# Patient Record
Sex: MALE | Race: WHITE
[De-identification: names, ages, dates, MRNs, and addresses within clinical notes are randomized per-mention and may not be internally consistent; named-entity substitution may affect disease eponyms.]

---

## 2018-10-18 ENCOUNTER — HOSPITAL ENCOUNTER (INPATIENT)
Dept: HOSPITAL 47 - EC | Age: 63
LOS: 3 days | Discharge: HOME | DRG: 203 | End: 2018-10-21
Payer: COMMERCIAL

## 2018-10-18 VITALS — BODY MASS INDEX: 25 KG/M2

## 2018-10-18 DIAGNOSIS — Z82.49: ICD-10-CM

## 2018-10-18 DIAGNOSIS — J20.9: ICD-10-CM

## 2018-10-18 DIAGNOSIS — R09.02: ICD-10-CM

## 2018-10-18 DIAGNOSIS — Z80.1: ICD-10-CM

## 2018-10-18 DIAGNOSIS — J45.21: Primary | ICD-10-CM

## 2018-10-18 DIAGNOSIS — I10: ICD-10-CM

## 2018-10-18 DIAGNOSIS — Z79.899: ICD-10-CM

## 2018-10-18 DIAGNOSIS — K21.9: ICD-10-CM

## 2018-10-18 DIAGNOSIS — Z79.51: ICD-10-CM

## 2018-10-18 PROCEDURE — 85025 COMPLETE CBC W/AUTO DIFF WBC: CPT

## 2018-10-18 PROCEDURE — 36415 COLL VENOUS BLD VENIPUNCTURE: CPT

## 2018-10-18 PROCEDURE — 94760 N-INVAS EAR/PLS OXIMETRY 1: CPT

## 2018-10-18 PROCEDURE — 85730 THROMBOPLASTIN TIME PARTIAL: CPT

## 2018-10-18 PROCEDURE — 96361 HYDRATE IV INFUSION ADD-ON: CPT

## 2018-10-18 PROCEDURE — 93005 ELECTROCARDIOGRAM TRACING: CPT

## 2018-10-18 PROCEDURE — 94660 CPAP INITIATION&MGMT: CPT

## 2018-10-18 PROCEDURE — 87070 CULTURE OTHR SPECIMN AEROBIC: CPT

## 2018-10-18 PROCEDURE — 96365 THER/PROPH/DIAG IV INF INIT: CPT

## 2018-10-18 PROCEDURE — 80048 BASIC METABOLIC PNL TOTAL CA: CPT

## 2018-10-18 PROCEDURE — 82553 CREATINE MB FRACTION: CPT

## 2018-10-18 PROCEDURE — 94640 AIRWAY INHALATION TREATMENT: CPT

## 2018-10-18 PROCEDURE — 99291 CRITICAL CARE FIRST HOUR: CPT

## 2018-10-18 PROCEDURE — 83880 ASSAY OF NATRIURETIC PEPTIDE: CPT

## 2018-10-18 PROCEDURE — 71045 X-RAY EXAM CHEST 1 VIEW: CPT

## 2018-10-18 PROCEDURE — 83735 ASSAY OF MAGNESIUM: CPT

## 2018-10-18 PROCEDURE — 87205 SMEAR GRAM STAIN: CPT

## 2018-10-18 PROCEDURE — 80053 COMPREHEN METABOLIC PANEL: CPT

## 2018-10-18 PROCEDURE — 85379 FIBRIN DEGRADATION QUANT: CPT

## 2018-10-18 PROCEDURE — 96375 TX/PRO/DX INJ NEW DRUG ADDON: CPT

## 2018-10-18 PROCEDURE — 82550 ASSAY OF CK (CPK): CPT

## 2018-10-18 PROCEDURE — 84484 ASSAY OF TROPONIN QUANT: CPT

## 2018-10-18 PROCEDURE — 85610 PROTHROMBIN TIME: CPT

## 2018-10-18 NOTE — ED
Asthma HPI





- General


Stated Complaint: DAGOBERTO





- History of Present Illness


Initial Comments: 


Zhao is a 63-year-old male who presents to the ED via EMS for evaluation of 

shortness of breath.  Upon initial evaluation the patient is in acute 

respiratory distress, 2 word dyspnea, on CPAP from EMS.  Patient is able to 

answer yes no questions.  Patient reports he's had progressively worsening 

shortness of breath and wheezing for approximately 2 weeks.  It worsened today 

which prompted his wife to call 911.


Patient denies chest pain, palpitations or exertional symptoms.  He reports 

initially his symptoms would improve with using his rescue inhaler but today 

they did not.  He denies any fever or productive cough.





- Related Data


 Home Medications











 Medication  Instructions  Recorded  Confirmed


 


Albuterol Sulfate [Proair Hfa] 1 - 2 puff INHALATION Q6HR PRN 10/18/18 10/18/18


 


Beclomethasone Dipropionate [Qvar 1 puff INHALATION RT-BID 10/18/18 10/18/18





80 mcg]   


 


Fluticasone Propionate [Flovent 1 puff INHALATION RT-DAILY PRN 10/18/18 10/18/18





Diskus]   


 


Lisinopril-Hctz 20-12.5 mg 1 tab PO DAILY 10/18/18 10/18/18





[Zestoretic 20-12.5]   


 


Pantoprazole [Protonix] 40 mg PO DAILY 10/18/18 10/18/18











 Allergies











Allergy/AdvReac Type Severity Reaction Status Date / Time


 


No Known Allergies Allergy   Verified 10/18/18 23:39














Review of Systems


ROS Statement: 


Those systems with pertinent positive or pertinent negative responses have been 

documented in the HPI.





ROS Other: All systems not noted in ROS Statement are negative.





Past Medical History


Past Medical History: Asthma, GERD/Reflux, Hypertension


Additional Past Medical History / Comment(s): receives alg injections for 

seasonal,dust,mold,animal dander,trees every 2 weeks.


History of Any Multi-Drug Resistant Organisms: None Reported


Past Surgical History: Hernia Repair


Past Anesthesia/Blood Transfusion Reactions: No Reported Reaction


Smoking Status: Never smoker





- Past Family History


  ** Mother


Family Medical History: Cancer


Additional Family Medical History / Comment(s): lung mets to brain





  ** Father


Family Medical History: Hypertension


Additional Family Medical History / Comment(s): annemarie





General Exam





- General Exam Comments


Initial Comments: 


Physical Exam


GENERAL:


Patient is in moderate respiratory distress





HENT:


Normocephalic, Atraumatic. 





EYES:


PERRL, EOMI





PULMONARY:


Tachypneic, labored respirations, expiratory and expiratory wheezing, 

retractions





CARDIOVASCULAR:


Tachycardic, regular 


Warm and well-perfused extremities





ABDOMEN:


Soft and nontender with normal bowel sounds. 





SKIN:


Skin is clear with no lesions or rashes and otherwise unremarkable.


There is skin tenting and the patient appears dehydrated





: 


Deferred





NEUROLOGIC:


Patient is alert and oriented x3.  Moving all extremities spontaneously





MUSCULOSKELETAL:


Normal extremities with adequate strength and full range of motion.  No lower 

extremity swelling or edema.  No calf tenderness.  





PSYCHIATRIC:


Normal psychiatric evaluation.  





Limitations: no limitations








Course


 Vital Signs











  10/18/18 10/18/18 10/18/18





  23:26 23:37 23:46


 


Temperature 97.6 F  


 


Pulse Rate 131 H 122 H 


 


Pulse Rate [   





Cardiac Monitor   





]   


 


Respiratory 24 22 20





Rate   


 


Blood Pressure 178/126  


 


Blood Pressure   





[Left Arm]   


 


O2 Sat by Pulse 100  





Oximetry   














  10/18/18 10/19/18 10/19/18





  23:51 00:06 00:54


 


Temperature   


 


Pulse Rate 120 H 116 H 101 H


 


Pulse Rate [   





Cardiac Monitor   





]   


 


Respiratory 18 14 13





Rate   


 


Blood Pressure  138/96 117/88


 


Blood Pressure   





[Left Arm]   


 


O2 Sat by Pulse  99 100





Oximetry   














  10/19/18 10/19/18





  01:43 01:45


 


Temperature  97.8 F


 


Pulse Rate  90


 


Pulse Rate [ 116 H 





Cardiac Monitor  





]  


 


Respiratory 26 H 14





Rate  


 


Blood Pressure  122/78


 


Blood Pressure 142/70 





[Left Arm]  


 


O2 Sat by Pulse 98 99





Oximetry  














Medical Decision Making





- Medical Decision Making


Patient was seen and evaluated immediately upon arrival in the emergency 

department.  Patient was noted to be in moderate respiratory distress.  Patient 

was on CPAP with oxygen saturations in the 100s.  BiPAP was immediately placed.





Patient and received a single DuoNeb treatment in route to the hospital - 

double DuoNeb therapy and steroids were ordered





Labs and imaging were ordered


EKG with sinus tachycardia





Labs with elevated globin which is likely hemoconcentration secondary to 

dehydration, troponin and d-dimer negative


Patient improving after double DuoNeb and steroids and oxygen saturation 

remains 100% on BiPAP





Patient reports improvement, but has persistent wheezing, IV magnesium was 

ordered





At this time I do feel the patient warrants admission to the hospital.  His 

oxygenation is improving his work of breathing is improving, his tachypnea and 

is improving as well as his tachycardia.  I suspect the patient will require 

round-the-clock duo nebs and steroids.  Patient is agreeable to plan for 

admission.  Patient is currently on BiPAP I will plan to admit to the stepdown 

unit.





- Lab Data


Result diagrams: 


 10/18/18 23:37





 10/18/18 23:37


 Lab Results











  10/18/18 10/18/18 10/18/18 Range/Units





  23:37 23:37 23:37 


 


WBC   9.8   (3.8-10.6)  k/uL


 


RBC   5.07   (4.30-5.90)  m/uL


 


Hgb   18.0 H   (13.0-17.5)  gm/dL


 


Hct   52.5   (39.0-53.0)  %


 


MCV   103.6 H   (80.0-100.0)  fL


 


MCH   35.5 H   (25.0-35.0)  pg


 


MCHC   34.3   (31.0-37.0)  g/dL


 


RDW   12.7   (11.5-15.5)  %


 


Plt Count   275   (150-450)  k/uL


 


Neutrophils %   68   %


 


Lymphocytes %   11   %


 


Monocytes %   5   %


 


Eosinophils %   13   %


 


Basophils %   1   %


 


Neutrophils #   6.7   (1.3-7.7)  k/uL


 


Lymphocytes #   1.1   (1.0-4.8)  k/uL


 


Monocytes #   0.5   (0-1.0)  k/uL


 


Eosinophils #   1.3 H   (0-0.7)  k/uL


 


Basophils #   0.1   (0-0.2)  k/uL


 


Macrocytosis   Slight   


 


PT     (9.0-12.0)  sec


 


INR     (<1.2)  


 


APTT     (22.0-30.0)  sec


 


D-Dimer     (<0.60)  mg/L FEU


 


Sodium    141  (137-145)  mmol/L


 


Potassium    3.9  (3.5-5.1)  mmol/L


 


Chloride    101  ()  mmol/L


 


Carbon Dioxide    28  (22-30)  mmol/L


 


Anion Gap    12  mmol/L


 


BUN    17  (9-20)  mg/dL


 


Creatinine    0.98  (0.66-1.25)  mg/dL


 


Est GFR (CKD-EPI)AfAm    >90  (>60 ml/min/1.73 sqM)  


 


Est GFR (CKD-EPI)NonAf    84  (>60 ml/min/1.73 sqM)  


 


Glucose    133 H  (74-99)  mg/dL


 


Calcium    9.7  (8.4-10.2)  mg/dL


 


Magnesium    2.2  (1.6-2.3)  mg/dL


 


Total Bilirubin    1.1  (0.2-1.3)  mg/dL


 


AST    43  (17-59)  U/L


 


ALT    42  (21-72)  U/L


 


Alkaline Phosphatase    99  ()  U/L


 


Total Creatine Kinase  78    ()  U/L


 


CK-MB (CK-2)  1.7    (0.0-2.4)  ng/mL


 


CK-MB (CK-2) Rel Index  2.2    


 


Troponin I  <0.012    (0.000-0.034)  ng/mL


 


NT-Pro-B Natriuret Pep     pg/mL


 


Total Protein    8.4 H  (6.3-8.2)  g/dL


 


Albumin    4.6  (3.5-5.0)  g/dL














  10/18/18 10/18/18 Range/Units





  23:37 23:37 


 


WBC    (3.8-10.6)  k/uL


 


RBC    (4.30-5.90)  m/uL


 


Hgb    (13.0-17.5)  gm/dL


 


Hct    (39.0-53.0)  %


 


MCV    (80.0-100.0)  fL


 


MCH    (25.0-35.0)  pg


 


MCHC    (31.0-37.0)  g/dL


 


RDW    (11.5-15.5)  %


 


Plt Count    (150-450)  k/uL


 


Neutrophils %    %


 


Lymphocytes %    %


 


Monocytes %    %


 


Eosinophils %    %


 


Basophils %    %


 


Neutrophils #    (1.3-7.7)  k/uL


 


Lymphocytes #    (1.0-4.8)  k/uL


 


Monocytes #    (0-1.0)  k/uL


 


Eosinophils #    (0-0.7)  k/uL


 


Basophils #    (0-0.2)  k/uL


 


Macrocytosis    


 


PT   10.4  (9.0-12.0)  sec


 


INR   1.1  (<1.2)  


 


APTT   24.5  (22.0-30.0)  sec


 


D-Dimer   0.35  (<0.60)  mg/L FEU


 


Sodium    (137-145)  mmol/L


 


Potassium    (3.5-5.1)  mmol/L


 


Chloride    ()  mmol/L


 


Carbon Dioxide    (22-30)  mmol/L


 


Anion Gap    mmol/L


 


BUN    (9-20)  mg/dL


 


Creatinine    (0.66-1.25)  mg/dL


 


Est GFR (CKD-EPI)AfAm    (>60 ml/min/1.73 sqM)  


 


Est GFR (CKD-EPI)NonAf    (>60 ml/min/1.73 sqM)  


 


Glucose    (74-99)  mg/dL


 


Calcium    (8.4-10.2)  mg/dL


 


Magnesium    (1.6-2.3)  mg/dL


 


Total Bilirubin    (0.2-1.3)  mg/dL


 


AST    (17-59)  U/L


 


ALT    (21-72)  U/L


 


Alkaline Phosphatase    ()  U/L


 


Total Creatine Kinase    ()  U/L


 


CK-MB (CK-2)    (0.0-2.4)  ng/mL


 


CK-MB (CK-2) Rel Index    


 


Troponin I    (0.000-0.034)  ng/mL


 


NT-Pro-B Natriuret Pep  160   pg/mL


 


Total Protein    (6.3-8.2)  g/dL


 


Albumin    (3.5-5.0)  g/dL














- EKG Data


EKG Comments: 


EKG obtained at 11:29 PM, rate is 131, rhythm is sinus, there is a normal axis, 

normal intervals, , QRS 76, QTC is 431.  There is some hyperacute T waves 

in V2 through V4, no specific ST elevations or depressions.  No evidence of 

acute ischemia or infarction.  We will repeat EKG.








Critical Care Time


Critical Care Time: Yes


Total Critical Care Time: 30





Disposition


Clinical Impression: 


 Asthma with exacerbation





Disposition: ADMITTED AS IP TO THIS HOSP


Condition: Serious


Is patient prescribed a controlled substance at d/c from ED?: No

## 2018-10-19 LAB
ALBUMIN SERPL-MCNC: 4.6 G/DL (ref 3.5–5)
ALP SERPL-CCNC: 99 U/L (ref 38–126)
ALT SERPL-CCNC: 42 U/L (ref 21–72)
ANION GAP SERPL CALC-SCNC: 12 MMOL/L
APTT BLD: 24.5 SEC (ref 22–30)
AST SERPL-CCNC: 43 U/L (ref 17–59)
BASOPHILS # BLD AUTO: 0.1 K/UL (ref 0–0.2)
BASOPHILS NFR BLD AUTO: 1 %
BUN SERPL-SCNC: 17 MG/DL (ref 9–20)
CALCIUM SPEC-MCNC: 9.7 MG/DL (ref 8.4–10.2)
CHLORIDE SERPL-SCNC: 101 MMOL/L (ref 98–107)
CK SERPL-CCNC: 78 U/L (ref 55–170)
CO2 SERPL-SCNC: 28 MMOL/L (ref 22–30)
D DIMER PPP FEU-MCNC: 0.35 MG/L FEU (ref ?–0.6)
EOSINOPHIL # BLD AUTO: 1.3 K/UL (ref 0–0.7)
EOSINOPHIL NFR BLD AUTO: 13 %
ERYTHROCYTE [DISTWIDTH] IN BLOOD BY AUTOMATED COUNT: 5.07 M/UL (ref 4.3–5.9)
ERYTHROCYTE [DISTWIDTH] IN BLOOD: 12.7 % (ref 11.5–15.5)
GLUCOSE BLD-MCNC: 166 MG/DL (ref 75–99)
GLUCOSE BLD-MCNC: 168 MG/DL (ref 75–99)
GLUCOSE SERPL-MCNC: 133 MG/DL (ref 74–99)
HCT VFR BLD AUTO: 52.5 % (ref 39–53)
HGB BLD-MCNC: 18 GM/DL (ref 13–17.5)
INR PPP: 1.1 (ref ?–1.2)
LYMPHOCYTES # SPEC AUTO: 1.1 K/UL (ref 1–4.8)
LYMPHOCYTES NFR SPEC AUTO: 11 %
MAGNESIUM SPEC-SCNC: 2.2 MG/DL (ref 1.6–2.3)
MCH RBC QN AUTO: 35.5 PG (ref 25–35)
MCHC RBC AUTO-ENTMCNC: 34.3 G/DL (ref 31–37)
MCV RBC AUTO: 103.6 FL (ref 80–100)
MONOCYTES # BLD AUTO: 0.5 K/UL (ref 0–1)
MONOCYTES NFR BLD AUTO: 5 %
NEUTROPHILS # BLD AUTO: 6.7 K/UL (ref 1.3–7.7)
NEUTROPHILS NFR BLD AUTO: 68 %
PLATELET # BLD AUTO: 275 K/UL (ref 150–450)
POTASSIUM SERPL-SCNC: 3.9 MMOL/L (ref 3.5–5.1)
PROT SERPL-MCNC: 8.4 G/DL (ref 6.3–8.2)
PT BLD: 10.4 SEC (ref 9–12)
SODIUM SERPL-SCNC: 141 MMOL/L (ref 137–145)
TROPONIN I SERPL-MCNC: <0.012 NG/ML (ref 0–0.03)
WBC # BLD AUTO: 9.8 K/UL (ref 3.8–10.6)

## 2018-10-19 RX ADMIN — IPRATROPIUM BROMIDE AND ALBUTEROL SULFATE SCH: .5; 3 SOLUTION RESPIRATORY (INHALATION) at 18:58

## 2018-10-19 RX ADMIN — IPRATROPIUM BROMIDE AND ALBUTEROL SULFATE SCH: .5; 3 SOLUTION RESPIRATORY (INHALATION) at 23:33

## 2018-10-19 RX ADMIN — IPRATROPIUM BROMIDE AND ALBUTEROL SULFATE SCH ML: .5; 3 SOLUTION RESPIRATORY (INHALATION) at 19:37

## 2018-10-19 RX ADMIN — HEPARIN SODIUM SCH UNIT: 5000 INJECTION, SOLUTION INTRAVENOUS; SUBCUTANEOUS at 08:46

## 2018-10-19 RX ADMIN — METHYLPREDNISOLONE SODIUM SUCCINATE SCH MG: 125 INJECTION, POWDER, FOR SOLUTION INTRAMUSCULAR; INTRAVENOUS at 13:11

## 2018-10-19 RX ADMIN — INSULIN ASPART SCH: 100 INJECTION, SOLUTION INTRAVENOUS; SUBCUTANEOUS at 20:23

## 2018-10-19 RX ADMIN — HEPARIN SODIUM SCH UNIT: 5000 INJECTION, SOLUTION INTRAVENOUS; SUBCUTANEOUS at 16:39

## 2018-10-19 RX ADMIN — METHYLPREDNISOLONE SODIUM SUCCINATE SCH MG: 125 INJECTION, POWDER, FOR SOLUTION INTRAMUSCULAR; INTRAVENOUS at 16:40

## 2018-10-19 RX ADMIN — FAMOTIDINE SCH MG: 10 INJECTION, SOLUTION INTRAVENOUS at 20:36

## 2018-10-19 RX ADMIN — FORMOTEROL FUMARATE DIHYDRATE SCH MCG: 20 SOLUTION RESPIRATORY (INHALATION) at 19:37

## 2018-10-19 RX ADMIN — INSULIN ASPART SCH UNIT: 100 INJECTION, SOLUTION INTRAVENOUS; SUBCUTANEOUS at 16:39

## 2018-10-19 RX ADMIN — IPRATROPIUM BROMIDE AND ALBUTEROL SULFATE SCH ML: .5; 3 SOLUTION RESPIRATORY (INHALATION) at 15:40

## 2018-10-19 RX ADMIN — BUDESONIDE SCH MG: 1 SUSPENSION RESPIRATORY (INHALATION) at 19:37

## 2018-10-19 RX ADMIN — LEVOFLOXACIN SCH MG: 500 TABLET, FILM COATED ORAL at 13:07

## 2018-10-19 NOTE — XR
EXAMINATION TYPE: XR chest 1V portable

 

DATE OF EXAM: 10/18/2018

 

COMPARISON: 10/25/2013

 

HISTORY: Difficulty breathing

 

TECHNIQUE: Single frontal view of the chest is obtained.

 

FINDINGS:  Heart and mediastinum are normal. Lungs are clear. Diaphragm is normal. There are chest le
ads. Bony thorax is intact.

 

IMPRESSION:  Normal chest. No change.

## 2018-10-19 NOTE — P.HPIM
History of Present Illness





This is a pleasant 63 years old female with past medical history of asthma, GERD

, hypertension.  Who presents because of acute dyspnea.  Typical asthma attack.

  Patient states he has history of asthma since age 8.  And over the last 2 

weeks he was getting progressively short of breath despite the wife was asking 

him to come to the hospital or see his doctor.  Yesterday he got severely short 

of breath after he came from marked fecal debris.  I decided to come to the 

hospital.  His dyspnea is associated with a dry cough.  No phlegm.  No chest 

pain.  No change in urine or bowel habits.  No upper respiratory tract symptoms.


no Fever.  No sick contacts.  Patient states he has 2 ducts for 5 years which 

he counseled to keep away from contributed to his asthma attack.


Patient was started on steroids and breathing treatment and oxygen and 

antibiotic.  And he starts feeling better





Review of Systems





CONSTITUTIONAL: No fever, no malaise, no fatigue. 


HEENT: No recent visual problems or hearing problems. Denied any sore throat. 


CARDIOVASCULAR: No  orthopnea, PND, no palpitations, no syncope. 


PULMONARY: No shortness of breath, no cough, no hemoptysis. 


GASTROINTESTINAL: No diarrhea, no nausea, no vomiting, no abdominal pain. 

Normoactive bowel sounds. 


NEUROLOGICAL: No headaches, no weakness, no numbness. 


HEMATOLOGICAL: Denies any bleeding or petechiae. 


GENITOURINARY: Denies any burning micturition, frequency, or urgency. 


MUSCULOSKELETAL/RHEUMATOLOGICAL: Denies any joint pain, swelling, or any muscle 

pain. 


ENDOCRINE: Denies any polyuria or polydipsia.














Past Medical History


Past Medical History: Asthma, GERD/Reflux, Hypertension


Additional Past Medical History / Comment(s): receives alg injections for 

seasonal,dust,mold,animal dander,trees every 2 weeks.


History of Any Multi-Drug Resistant Organisms: None Reported


Past Surgical History: Hernia Repair


Past Anesthesia/Blood Transfusion Reactions: No Reported Reaction


Smoking Status: Never smoker





- Past Family History


  ** Mother


Family Medical History: Cancer


Additional Family Medical History / Comment(s): lung mets to brain





  ** Father


Family Medical History: Hypertension


Additional Family Medical History / Comment(s): [employer]





Medications and Allergies


 Home Medications











 Medication  Instructions  Recorded  Confirmed  Type


 


Albuterol Sulfate [Proair Hfa] 1 - 2 puff INHALATION Q6HR PRN 10/18/18 10/18/18 

History


 


Beclomethasone Dipropionate [Qvar 1 puff INHALATION RT-BID 10/18/18 10/18/18 

History





80 mcg]    


 


Fluticasone Propionate [Flovent 1 puff INHALATION RT-DAILY PRN 10/18/18 10/18/

18 History





Diskus]    


 


Lisinopril-Hctz 20-12.5 mg 1 tab PO DAILY 10/18/18 10/18/18 History





[Zestoretic 20-12.5]    


 


Pantoprazole [Protonix] 40 mg PO DAILY 10/18/18 10/18/18 History











 Allergies











Allergy/AdvReac Type Severity Reaction Status Date / Time


 


No Known Allergies Allergy   Verified 10/18/18 23:39














Physical Exam


Vitals: 


 Vital Signs











  Temp Pulse Pulse Resp BP BP Pulse Ox


 


 10/19/18 16:00    99  16   124/67  99


 


 10/19/18 15:56   104 H   20   


 


 10/19/18 15:41   104 H   20   


 


 10/19/18 12:00    85  16   114/63 


 


 10/19/18 11:57     16   


 


 10/19/18 11:16   104 H     


 


 10/19/18 11:06   100     


 


 10/19/18 08:00  97.2 F L   103 H  16   122/70  97


 


 10/19/18 07:34   110 H     


 


 10/19/18 07:24   108 H     


 


 10/19/18 04:00  98 F   96  24    97


 


 10/19/18 01:45  97.8 F  90   14  122/78   99


 


 10/19/18 01:43    116 H  26 H   142/70  98


 


 10/19/18 00:54   101 H   13  117/88   100


 


 10/19/18 00:06   116 H   14  138/96   99


 


 10/18/18 23:51   120 H   18   


 


 10/18/18 23:46     20   


 


 10/18/18 23:37   122 H   22   


 


 10/18/18 23:26  97.6 F  131 H   24  178/126   100








 Intake and Output











 10/19/18 10/19/18 10/19/18





 06:59 14:59 22:59


 


Intake Total 480 918 


 


Balance 480 918 


 


Intake:   


 


  Intake, IV Titration  600 





  Amount   


 


    Sodium Chloride 0.9% 1,  600 





    000 ml @ 75 mls/hr IV .   





    [id number] STA Rx#:[id number]   


 


  Oral 480 318 


 


Other:   


 


  Voiding Method Urinal Urinal 


 


  # Voids 1  


 


  Weight 75.5 kg  














GENERAL: The patient is alert and oriented x3, not in any acute distress. Well 

developed, well nourished. 


HEENT: Pupils are round and equally reacting to light. EOMI. No scleral 

icterus. No conjunctival pallor. Normocephalic, atraumatic. No pharyngeal 

erythema. No thyromegaly. 


CARDIOVASCULAR: S1 and S2 present. No murmurs, rubs, or gallops. 


PULMONARY: Chest is clear to auscultation, no wheezing or crackles. 


ABDOMEN: Soft, nontender, nondistended, normoactive bowel sounds. No palpable 

organomegaly. 


MUSCULOSKELETAL: No joint swelling or deformity. 


EXTREMITIES: No cyanosis, clubbing, or pedal edema. 


NEUROLOGICAL: Gross neurological examination did not reveal any focal deficits. 


SKIN: No rashes.





Results


CBC & Chem 7: 


 10/18/18 23:37





 10/18/18 23:37


Labs: 


 Abnormal Lab Results - Last 24 Hours (Table)











  10/18/18 10/18/18 10/19/18 Range/Units





  23:37 23:37 11:53 


 


Hgb  18.0 H    (13.0-17.5)  gm/dL


 


MCV  103.6 H    (80.0-100.0)  fL


 


MCH  35.5 H    (25.0-35.0)  pg


 


Eosinophils #  1.3 H    (0-0.7)  k/uL


 


Glucose   133 H   (74-99)  mg/dL


 


POC Glucose (mg/dL)    168 H  (75-99)  mg/dL


 


Total Protein   8.4 H   (6.3-8.2)  g/dL














  10/19/18 Range/Units





  16:35 


 


Hgb   (13.0-17.5)  gm/dL


 


MCV   (80.0-100.0)  fL


 


MCH   (25.0-35.0)  pg


 


Eosinophils #   (0-0.7)  k/uL


 


Glucose   (74-99)  mg/dL


 


POC Glucose (mg/dL)  166 H  (75-99)  mg/dL


 


Total Protein   (6.3-8.2)  g/dL














Thrombosis Risk Factor Assmnt





- Choose All That Apply


Each Risk Factor Represents 2 Points: Age 61-74 years


Thrombosis Risk Factor Assessment Total Risk Factor Score: 2


Thrombosis Risk Factor Assessment Level: Low Risk





Assessment and Plan


Assessment: 





Acute asthma attack


Possible acute tracheobronchitis


Essential hypertension


GERD 


Plan: 





This is a pleasant 63 years old female who presents because of acute asthma 

exacerbation.  Continue with steroids, oxygen, breathing treatment and 

antibiotic.  Continue same treatment.  Continue symptomatic treatment.  Resume 

home medication.  Monitor lytes and vitals.  Pulmonary consult is appreciated.  

GI and DVT prophylaxis.  Further recommendation the clinical course of the 

patient


DVT prophylaxis:Subcutaneous heparin


GI prophylaxis:Pepcid


PT/OT: Pending


Prognosis is guarded

## 2018-10-19 NOTE — P.CNPUL
History of Present Illness


Consult date: 10/19/18


Reason for consult: dyspnea, cough, asthma, hypoxemia, abnormal CXR/CT


Chief complaint: Shortness of breath and asthma exacerbation


History of present illness: 





Pulmonary consult dated 10/19/2018





63-year-old male with a history of gastroesophageal reflux disease, hypertension

, and chronic bronchial asthma.  The patient sees a physician out in [place] for 

his medical issues.  Over the last number of days, the patient states his 

asthma is acting up.  He apparently delivers fertilizer farmers and apparently 

it was particularly stefanie.  He states he has profound shortness of breath chest 

tightness wheezing and cough.  Coughing up some phlegm which is yellow-green in 

color.  The patient denies any fever or chills.  He apparently has been using 

his rescue inhaler many many times and he actually estimates that he is use his 

rescue inhaler 42 times.  The patient is typically on protonic's lisinopril/

HCTZ Qvar albuterol updrafts and albuterol inhaler for his medical issues.  The 

patient states that the short acting beta agonist really did not help.  For 

that reason, he came into the emergency room to be evaluated.  He is feeling a 

bit better today but certainly has a long way to go yet.  Again the patient has 

never seen a pulmonologist in the past.  This probably useful if he does.  He 

has NO KNOWN DRUG ALLERGIES.  He apparently receives ALLERGY injections for 

seasonal allergens dust mold and animal dander trees, etc., every 2 weeks.  

Surgically, he has a history of hernia repair.  He is a lifelong nonsmoker.





Review of Systems





A 14 point review of system is positive for shortness of breath chest tightness 

wheezing cough and yellow brown phlegm production.  No fever or chills.





Past Medical History


Past Medical History: Asthma, GERD/Reflux, Hypertension


Additional Past Medical History / Comment(s): receives alg injections for 

seasonal,dust,mold,animal dander,trees every 2 weeks.


History of Any Multi-Drug Resistant Organisms: None Reported


Past Surgical History: Hernia Repair


Past Anesthesia/Blood Transfusion Reactions: No Reported Reaction


Smoking Status: Never smoker





- Past Family History


  ** Mother


Family Medical History: Cancer


Additional Family Medical History / Comment(s): lung mets to brain





  ** Father


Family Medical History: Hypertension


Additional Family Medical History / Comment(s): pacermaker





Medications and Allergies


 Home Medications











 Medication  Instructions  Recorded  Confirmed  Type


 


Albuterol Sulfate [Proair Hfa] 1 - 2 puff INHALATION Q6HR PRN 10/18/18 10/18/18 

History


 


Beclomethasone Dipropionate [Qvar 1 puff INHALATION RT-BID 10/18/18 10/18/18 

History





80 mcg]    


 


Fluticasone Propionate [Flovent 1 puff INHALATION RT-DAILY PRN 10/18/18 10/18/

18 History





Diskus]    


 


Lisinopril-Hctz 20-12.5 mg 1 tab PO DAILY 10/18/18 10/18/18 History





[Zestoretic 20-12.5]    


 


Pantoprazole [Protonix] 40 mg PO DAILY 10/18/18 10/18/18 History











 Allergies











Allergy/AdvReac Type Severity Reaction Status Date / Time


 


No Known Allergies Allergy   Verified 10/18/18 23:39














Physical Exam


Osteopathic Statement: *.  No significant issues noted on an osteopathic 

structural exam other than those noted in the History and Physical/Consult.


Vitals: 


 Vital Signs











  Temp Pulse Pulse Resp BP BP Pulse Ox


 


 10/19/18 11:16   104 H     


 


 10/19/18 11:06   100     


 


 10/19/18 08:00  97.2 F L   103 H  16   122/70  97


 


 10/19/18 07:34   110 H     


 


 10/19/18 07:24   108 H     


 


 10/19/18 04:00  98 F   96  24    97


 


 10/19/18 01:45  97.8 F  90   14  122/78   99


 


 10/19/18 01:43    116 H  26 H   142/70  98


 


 10/19/18 00:54   101 H   13  117/88   100


 


 10/19/18 00:06   116 H   14  138/96   99


 


 10/18/18 23:51   120 H   18   


 


 10/18/18 23:46     20   


 


 10/18/18 23:37   122 H   22   


 


 10/18/18 23:26  97.6 F  131 H   24  178/126   100








 Intake and Output











 10/18/18 10/19/18 10/19/18





 22:59 06:59 14:59


 


Intake Total  480 118


 


Balance  480 118


 


Intake:   


 


  Oral  480 118


 


Other:   


 


  Voiding Method  Urinal 


 


  # Voids  1 


 


  Weight  75.5 kg 














No acute distress, oriented 3.  Mildly tachypnea and dyspneic.  Some mild 

audible wheezing noted.





HEENT examination is grossly unremarkable.  Mucous membranes are moist.  No 

oral lesions.





Neck supple.  Full range of motion.  No adenopathy thyromegaly or neck vein 

distention.





Cardiovascular examination reveals regular rhythm rate.  S1-S2 normal.  No S3 

or S4.  No discernible murmur noted.  Heart rate is 100.





Lungs reveal diminished breath sounds throughout.  There is prolongation on 

forced maneuver.  Patient coughs and wheezes on forced maneuver.  Breath sounds 

are equal bilaterally.  Adventitious lung sounds are more prominent on forced 

maneuver..





Abdomen soft bowel sounds are heard.  No masses or tenderness.





Extremities are intact.  No cyanosis clubbing or edema.





Skin is without rash or lesion.





Neurologic examination is brief but nonfocal.





Results





- Laboratory Findings


CBC and BMP: 


 10/18/18 23:37





 10/18/18 23:37


PT/INR, D-dimer











PT  10.4 sec (9.0-12.0)   10/18/18  23:37    


 


INR  1.1  (<1.2)   10/18/18  23:37    


 


D-Dimer  0.35 mg/L FEU (<0.60)   10/18/18  23:37    








Abnormal lab findings: 


 Abnormal Labs











  10/18/18 10/18/18





  23:37 23:37


 


Hgb  18.0 H 


 


MCV  103.6 H 


 


MCH  35.5 H 


 


Eosinophils #  1.3 H 


 


Glucose   133 H


 


Total Protein   8.4 H














- Diagnostic Findings


Chest x-ray: report reviewed, image reviewed (Chest x-ray, labs and medications 

are reviewed extensively.)





Assessment and Plan


Assessment: 





Assessment





Asthma exacerbation likely complicated by purulent tracheobronchitis





Lifelong nonsmoker.





History of hypertension





History of gastroesophageal reflux disease








Plan: 





Plan dated 10/19/2018





The patient's medications are reviewed.  He'll need a short acting beta agonist

, I short acting muscarinic antagonist, a long-acting beta agonist and inhaled 

corticosteroid.  In addition, the patient will need systemic corticosteroids 

and oral antibiotics.  He probably will need another day or 2 in the hospital.  

He apparently was on Singulair in the past but stopped it because it caused 

abdominal pains.  He stopped and then resumed it after 2 weeks and it again 

caused abdominal pains and he has never gone back on it.  In addition, he is 

getting ALLERGY shots for one of the local allergist.  He get shots every 2 

weeks.  The patient will need follow-up in our office post discharge.  No 

additional recommendations are made.  Prognosis is guarded.


Time with Patient: Greater than 30

## 2018-10-20 VITALS — RESPIRATION RATE: 20 BRPM

## 2018-10-20 LAB
ANION GAP SERPL CALC-SCNC: 9 MMOL/L
BASOPHILS # BLD AUTO: 0 K/UL (ref 0–0.2)
BASOPHILS NFR BLD AUTO: 0 %
BUN SERPL-SCNC: 15 MG/DL (ref 9–20)
CALCIUM SPEC-MCNC: 9.3 MG/DL (ref 8.4–10.2)
CHLORIDE SERPL-SCNC: 106 MMOL/L (ref 98–107)
CO2 SERPL-SCNC: 27 MMOL/L (ref 22–30)
EOSINOPHIL # BLD AUTO: 0.1 K/UL (ref 0–0.7)
EOSINOPHIL NFR BLD AUTO: 1 %
ERYTHROCYTE [DISTWIDTH] IN BLOOD BY AUTOMATED COUNT: 4.37 M/UL (ref 4.3–5.9)
ERYTHROCYTE [DISTWIDTH] IN BLOOD: 12.7 % (ref 11.5–15.5)
GLUCOSE BLD-MCNC: 134 MG/DL (ref 75–99)
GLUCOSE BLD-MCNC: 135 MG/DL (ref 75–99)
GLUCOSE BLD-MCNC: 159 MG/DL (ref 75–99)
GLUCOSE BLD-MCNC: 164 MG/DL (ref 75–99)
GLUCOSE SERPL-MCNC: 189 MG/DL (ref 74–99)
HCT VFR BLD AUTO: 46.6 % (ref 39–53)
HGB BLD-MCNC: 15.3 GM/DL (ref 13–17.5)
LYMPHOCYTES # SPEC AUTO: 0.4 K/UL (ref 1–4.8)
LYMPHOCYTES NFR SPEC AUTO: 3 %
MCH RBC QN AUTO: 35.1 PG (ref 25–35)
MCHC RBC AUTO-ENTMCNC: 32.9 G/DL (ref 31–37)
MCV RBC AUTO: 106.6 FL (ref 80–100)
MONOCYTES # BLD AUTO: 0.3 K/UL (ref 0–1)
MONOCYTES NFR BLD AUTO: 2 %
NEUTROPHILS # BLD AUTO: 11.5 K/UL (ref 1.3–7.7)
NEUTROPHILS NFR BLD AUTO: 93 %
PLATELET # BLD AUTO: 253 K/UL (ref 150–450)
POTASSIUM SERPL-SCNC: 4.3 MMOL/L (ref 3.5–5.1)
SODIUM SERPL-SCNC: 142 MMOL/L (ref 137–145)
WBC # BLD AUTO: 12.3 K/UL (ref 3.8–10.6)

## 2018-10-20 RX ADMIN — IPRATROPIUM BROMIDE AND ALBUTEROL SULFATE SCH ML: .5; 3 SOLUTION RESPIRATORY (INHALATION) at 15:04

## 2018-10-20 RX ADMIN — INSULIN ASPART SCH UNIT: 100 INJECTION, SOLUTION INTRAVENOUS; SUBCUTANEOUS at 21:43

## 2018-10-20 RX ADMIN — IPRATROPIUM BROMIDE AND ALBUTEROL SULFATE SCH ML: .5; 3 SOLUTION RESPIRATORY (INHALATION) at 19:29

## 2018-10-20 RX ADMIN — FAMOTIDINE SCH MG: 10 INJECTION, SOLUTION INTRAVENOUS at 21:43

## 2018-10-20 RX ADMIN — PANTOPRAZOLE SODIUM SCH MG: 40 TABLET, DELAYED RELEASE ORAL at 06:22

## 2018-10-20 RX ADMIN — LISINOPRIL AND HYDROCHLOROTHIAZIDE SCH EACH: 12.5; 2 TABLET ORAL at 10:07

## 2018-10-20 RX ADMIN — IPRATROPIUM BROMIDE AND ALBUTEROL SULFATE SCH ML: .5; 3 SOLUTION RESPIRATORY (INHALATION) at 23:18

## 2018-10-20 RX ADMIN — BUDESONIDE SCH MG: 1 SUSPENSION RESPIRATORY (INHALATION) at 19:29

## 2018-10-20 RX ADMIN — HEPARIN SODIUM SCH UNIT: 5000 INJECTION, SOLUTION INTRAVENOUS; SUBCUTANEOUS at 17:25

## 2018-10-20 RX ADMIN — METHYLPREDNISOLONE SODIUM SUCCINATE SCH MG: 125 INJECTION, POWDER, FOR SOLUTION INTRAMUSCULAR; INTRAVENOUS at 17:25

## 2018-10-20 RX ADMIN — FAMOTIDINE SCH MG: 10 INJECTION, SOLUTION INTRAVENOUS at 10:07

## 2018-10-20 RX ADMIN — FORMOTEROL FUMARATE DIHYDRATE SCH MCG: 20 SOLUTION RESPIRATORY (INHALATION) at 07:16

## 2018-10-20 RX ADMIN — IPRATROPIUM BROMIDE AND ALBUTEROL SULFATE SCH ML: .5; 3 SOLUTION RESPIRATORY (INHALATION) at 03:15

## 2018-10-20 RX ADMIN — IPRATROPIUM BROMIDE AND ALBUTEROL SULFATE SCH ML: .5; 3 SOLUTION RESPIRATORY (INHALATION) at 07:16

## 2018-10-20 RX ADMIN — METHYLPREDNISOLONE SODIUM SUCCINATE SCH MG: 125 INJECTION, POWDER, FOR SOLUTION INTRAMUSCULAR; INTRAVENOUS at 12:03

## 2018-10-20 RX ADMIN — LEVOFLOXACIN SCH MG: 500 TABLET, FILM COATED ORAL at 12:01

## 2018-10-20 RX ADMIN — METHYLPREDNISOLONE SODIUM SUCCINATE SCH MG: 125 INJECTION, POWDER, FOR SOLUTION INTRAMUSCULAR; INTRAVENOUS at 06:22

## 2018-10-20 RX ADMIN — INSULIN ASPART SCH: 100 INJECTION, SOLUTION INTRAVENOUS; SUBCUTANEOUS at 06:20

## 2018-10-20 RX ADMIN — HEPARIN SODIUM SCH UNIT: 5000 INJECTION, SOLUTION INTRAVENOUS; SUBCUTANEOUS at 10:07

## 2018-10-20 RX ADMIN — INSULIN ASPART SCH UNIT: 100 INJECTION, SOLUTION INTRAVENOUS; SUBCUTANEOUS at 17:26

## 2018-10-20 RX ADMIN — INSULIN ASPART SCH UNIT: 100 INJECTION, SOLUTION INTRAVENOUS; SUBCUTANEOUS at 12:03

## 2018-10-20 RX ADMIN — BUDESONIDE SCH MG: 1 SUSPENSION RESPIRATORY (INHALATION) at 07:16

## 2018-10-20 RX ADMIN — FORMOTEROL FUMARATE DIHYDRATE SCH MCG: 20 SOLUTION RESPIRATORY (INHALATION) at 19:29

## 2018-10-20 RX ADMIN — METHYLPREDNISOLONE SODIUM SUCCINATE SCH MG: 125 INJECTION, POWDER, FOR SOLUTION INTRAMUSCULAR; INTRAVENOUS at 00:32

## 2018-10-20 RX ADMIN — IPRATROPIUM BROMIDE AND ALBUTEROL SULFATE SCH: .5; 3 SOLUTION RESPIRATORY (INHALATION) at 13:23

## 2018-10-20 RX ADMIN — HEPARIN SODIUM SCH UNIT: 5000 INJECTION, SOLUTION INTRAVENOUS; SUBCUTANEOUS at 00:32

## 2018-10-20 NOTE — P.PN
Subjective





This is a pleasant 63 years old female with past medical history of asthma, GERD

, hypertension.  Who presents because of acute dyspnea.  Typical asthma attack.

  Patient states he has history of asthma since age 8.  And over the last 2 

weeks he was getting progressively short of breath despite the wife was asking 

him to come to the hospital or see his doctor.  Yesterday he got severely short 

of breath after he came from marked fecal debris.  I decided to come to the 

hospital.  His dyspnea is associated with a dry cough.  No phlegm.  No chest 

pain.  No change in urine or bowel habits.  No upper respiratory tract symptoms.


no Fever.  No sick contacts.  Patient states he has 2 ducts for 5 years which 

he counseled to keep away from contributed to his asthma attack.


Patient was started on steroids and breathing treatment and oxygen and 

antibiotic.  And he starts feeling better





10/20/2018


Patient feels a little bit better today and actually he can talk much easier.  

Also he is able to make phlegm and expectorate the stump from his chest which 

make him more relieved.  Patient is still advised to keep away from peds as 

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 

his exacerbation of his asthma.  WBC is 12.3 K.  He is saturating 93-94% on 2 L 

nasal cannula





Objective





- Vital Signs


Vital signs: 


 Vital Signs











Temp  98.4 F   10/20/18 16:00


 


Pulse  97   10/20/18 16:00


 


Resp  18   10/20/18 16:00


 


BP  131/81   10/20/18 16:00


 


Pulse Ox  93 L  10/20/18 16:00








 Intake & Output











 10/19/18 10/20/18 10/20/18





 18:59 06:59 18:59


 


Intake Total 1218 530 


 


Balance 1218 530 


 


Weight  75.5 kg 


 


Intake:   


 


  Intake, IV Titration 600  





  Amount   


 


    Sodium Chloride 0.9% 1, 600  





    000 ml @ 75 mls/hr IV .   





    [id number] STA Rx#:[id number]   


 


  Oral 618 530 


 


Other:   


 


  Voiding Method Urinal Urinal 


 


  # Voids  2 1














- Exam





GENERAL: The patient is alert and oriented x3, not in any acute distress. Well 

developed, well nourished. 


HEENT: Pupils are round and equally reacting to light. EOMI. No scleral 

icterus. No conjunctival pallor. Normocephalic, atraumatic. No pharyngeal 

erythema. No thyromegaly. 


CARDIOVASCULAR: S1 and S2 present. No murmurs, rubs, or gallops. 


PULMONARY: Chest is clear to auscultation, no wheezing or crackles. 


ABDOMEN: Soft, nontender, nondistended, normoactive bowel sounds. No palpable 

organomegaly. 


MUSCULOSKELETAL: No joint swelling or deformity. 


EXTREMITIES: No cyanosis, clubbing, or pedal edema. 


NEUROLOGICAL: Gross neurological examination did not reveal any focal deficits. 


SKIN: No rashes.





- Labs


CBC & Chem 7: 


 10/20/18 08:50





 10/20/18 08:50


Labs: 


 Abnormal Lab Results - Last 24 Hours (Table)











  10/20/18 10/20/18 10/20/18 Range/Units





  05:52 08:50 08:50 


 


WBC   12.3 H   (3.8-10.6)  k/uL


 


MCV   106.6 H   (80.0-100.0)  fL


 


MCH   35.1 H   (25.0-35.0)  pg


 


Neutrophils #   11.5 H   (1.3-7.7)  k/uL


 


Lymphocytes #   0.4 L   (1.0-4.8)  k/uL


 


Glucose    189 H  (74-99)  mg/dL


 


POC Glucose (mg/dL)  135 H    (75-99)  mg/dL














  10/20/18 10/20/18 Range/Units





  12:00 16:21 


 


WBC    (3.8-10.6)  k/uL


 


MCV    (80.0-100.0)  fL


 


MCH    (25.0-35.0)  pg


 


Neutrophils #    (1.3-7.7)  k/uL


 


Lymphocytes #    (1.0-4.8)  k/uL


 


Glucose    (74-99)  mg/dL


 


POC Glucose (mg/dL)  134 H  164 H  (75-99)  mg/dL














Assessment and Plan


Assessment: 





Acute asthma attack


Possible acute tracheobronchitis


Essential hypertension


GERD 


Plan: 





This is a pleasant 63 years old female who presents because of acute asthma 

exacerbation.  Continue with steroids, oxygen, breathing treatment and 

antibiotic.  Continue same treatment.  Continue symptomatic treatment.  Resume 

home medication.  Monitor lytes and vitals.  Pulmonary consult is appreciated.  

GI and DVT prophylaxis.  Further recommendation the clinical course of the 

patient


DVT prophylaxis:Subcutaneous heparin


GI prophylaxis:Pepcid


PT/OT: Pending


Prognosis is guarded

## 2018-10-20 NOTE — P.PN
Subjective


Progress Note Date: 10/20/18


Principal diagnosis: 





Acute exacerbation of chronic bronchial asthma





Pulmonary consult dated 10/19/2018





63-year-old male with a history of gastroesophageal reflux disease, hypertension

, and chronic bronchial asthma.  The patient sees a physician out in [place] for 

his medical issues.  Over the last number of days, the patient states his 

asthma is acting up.  He apparently delivers fertilizer farmers and apparently 

it was particularly stefanie.  He states he has profound shortness of breath chest 

tightness wheezing and cough.  Coughing up some phlegm which is yellow-green in 

color.  The patient denies any fever or chills.  He apparently has been using 

his rescue inhaler many many times and he actually estimates that he is use his 

rescue inhaler 42 times.  The patient is typically on protonic's lisinopril/

HCTZ Qvar albuterol updrafts and albuterol inhaler for his medical issues.  The 

patient states that the short acting beta agonist really did not help.  For 

that reason, he came into the emergency room to be evaluated.  He is feeling a 

bit better today but certainly has a long way to go yet.  Again the patient has 

never seen a pulmonologist in the past.  This probably useful if he does.  He 

has NO KNOWN DRUG ALLERGIES.  He apparently receives ALLERGY injections for 

seasonal allergens dust mold and animal dander trees, etc., every 2 weeks.  

Surgically, he has a history of hernia repair.  He is a lifelong nonsmoker.





Progress note dated 10/20/2018





Patient is seen again today in follow-up in the intensive care unit.  Awake and 

alert in no acute distress.  He's been up ambulating in the room without acute 

distress.  He is much improved today as compared to yesterday but still not 

quite back to his baseline.  Still somewhat bronchospastic and wheezy.  He is 

maintaining O2 saturations in the 90s on 2 L/m per nasal cannula.  He's been 

afebrile.  Hemodynamically stable.  White count 12.3.  Hemoglobin 15.3.  

Creatinine 0.81.  He is continued on DuoNeb inhalations, Pulmicort and 

Perforomist inhalations, IV Solu-Medrol, Levaquin.





Objective





- Vital Signs


Vital signs: 


 Vital Signs











Temp  97.8 F   10/20/18 11:46


 


Pulse  92   10/20/18 11:46


 


Resp  20   10/20/18 11:46


 


BP  146/81   10/20/18 11:46


 


Pulse Ox  94 L  10/20/18 11:46








 Intake & Output











 10/19/18 10/20/18 10/20/18





 18:59 06:59 18:59


 


Intake Total 1218 530 


 


Balance 1218 530 


 


Weight  75.5 kg 


 


Intake:   


 


  Intake, IV Titration 600  





  Amount   


 


    Sodium Chloride 0.9% 1, 600  





    000 ml @ 75 mls/hr IV .   





    [id number] STA Rx#:[id number]   


 


  Oral 618 530 


 


Other:   


 


  Voiding Method Urinal Urinal 


 


  # Voids  2 














- Exam





GENERAL EXAM: Alert, active, comfortable in no apparent distress.


HEAD: Normocephalic.


EYES: Normal reaction of pupils, equal size.


NOSE: Clear with pink turbinates.


THROAT: No erythema or exudates.


NECK: No masses, no JVD.


CHEST: No chest wall deformity.


LUNGS: Equal air entry with bilateral end expiratory wheeze.  Diminished.


CVS: S1 and S2 normal with no audible murmur, regular rhythm.


ABDOMEN: No hepatosplenomegaly, normal bowel sounds, no guarding or rigidity.


SPINE: No scoliosis or deformity


SKIN: No rashes


CENTRAL NERVOUS SYSTEM: No focal deficits, tone is normal in all 4 extremities.


EXTREMITIES: There is no peripheral edema.  No clubbing, no cyanosis.  

Peripheral pulses are intact.





- Labs


CBC & Chem 7: 


 10/20/18 08:50





 10/20/18 08:50


Labs: 


 Abnormal Lab Results - Last 24 Hours (Table)











  10/19/18 10/20/18 10/20/18 Range/Units





  16:35 05:52 08:50 


 


WBC    12.3 H  (3.8-10.6)  k/uL


 


MCV    106.6 H  (80.0-100.0)  fL


 


MCH    35.1 H  (25.0-35.0)  pg


 


Neutrophils #    11.5 H  (1.3-7.7)  k/uL


 


Lymphocytes #    0.4 L  (1.0-4.8)  k/uL


 


Glucose     (74-99)  mg/dL


 


POC Glucose (mg/dL)  166 H  135 H   (75-99)  mg/dL














  10/20/18 10/20/18 Range/Units





  08:50 12:00 


 


WBC    (3.8-10.6)  k/uL


 


MCV    (80.0-100.0)  fL


 


MCH    (25.0-35.0)  pg


 


Neutrophils #    (1.3-7.7)  k/uL


 


Lymphocytes #    (1.0-4.8)  k/uL


 


Glucose  189 H   (74-99)  mg/dL


 


POC Glucose (mg/dL)   134 H  (75-99)  mg/dL














Assessment and Plan


Assessment: 





Assessment





Asthma exacerbation likely complicated by purulent tracheobronchitis





Lifelong nonsmoker.





History of hypertension





History of gastroesophageal reflux disease





Plan:





The patient was seen and evaluated by Dr. Chaney.  He is improved today as 

compared to yesterday.  Still not quite back to his baseline.  We'll continue 

with his current medications.  We'll increase his activity as tolerated.  We'll 

continue to follow.





I, the cosigning physician, performed a history & physical examination of the 

patient. Lungs sounds with bilateral end expiratory wheeze.  Maintaining good 

O2 saturations in the 90s on 2 L/m per nasal cannula.  I discussed the 

assessment and plan of care with my nurse practitioner, Fany Nagel. I attest to 

the above note as dictated by her.

## 2018-10-21 VITALS — DIASTOLIC BLOOD PRESSURE: 73 MMHG | TEMPERATURE: 98.2 F | SYSTOLIC BLOOD PRESSURE: 126 MMHG

## 2018-10-21 VITALS — HEART RATE: 75 BPM

## 2018-10-21 LAB
GLUCOSE BLD-MCNC: 131 MG/DL (ref 75–99)
GLUCOSE BLD-MCNC: 145 MG/DL (ref 75–99)

## 2018-10-21 RX ADMIN — FORMOTEROL FUMARATE DIHYDRATE SCH MCG: 20 SOLUTION RESPIRATORY (INHALATION) at 07:02

## 2018-10-21 RX ADMIN — LISINOPRIL AND HYDROCHLOROTHIAZIDE SCH EACH: 12.5; 2 TABLET ORAL at 09:04

## 2018-10-21 RX ADMIN — PANTOPRAZOLE SODIUM SCH MG: 40 TABLET, DELAYED RELEASE ORAL at 06:37

## 2018-10-21 RX ADMIN — METHYLPREDNISOLONE SODIUM SUCCINATE SCH MG: 125 INJECTION, POWDER, FOR SOLUTION INTRAMUSCULAR; INTRAVENOUS at 00:19

## 2018-10-21 RX ADMIN — FAMOTIDINE SCH MG: 10 INJECTION, SOLUTION INTRAVENOUS at 09:03

## 2018-10-21 RX ADMIN — METHYLPREDNISOLONE SODIUM SUCCINATE SCH MG: 125 INJECTION, POWDER, FOR SOLUTION INTRAMUSCULAR; INTRAVENOUS at 06:37

## 2018-10-21 RX ADMIN — METHYLPREDNISOLONE SODIUM SUCCINATE SCH MG: 125 INJECTION, POWDER, FOR SOLUTION INTRAMUSCULAR; INTRAVENOUS at 11:20

## 2018-10-21 RX ADMIN — HEPARIN SODIUM SCH UNIT: 5000 INJECTION, SOLUTION INTRAVENOUS; SUBCUTANEOUS at 09:03

## 2018-10-21 RX ADMIN — INSULIN ASPART SCH: 100 INJECTION, SOLUTION INTRAVENOUS; SUBCUTANEOUS at 06:34

## 2018-10-21 RX ADMIN — IPRATROPIUM BROMIDE AND ALBUTEROL SULFATE SCH ML: .5; 3 SOLUTION RESPIRATORY (INHALATION) at 11:05

## 2018-10-21 RX ADMIN — HEPARIN SODIUM SCH UNIT: 5000 INJECTION, SOLUTION INTRAVENOUS; SUBCUTANEOUS at 00:19

## 2018-10-21 RX ADMIN — LEVOFLOXACIN SCH MG: 500 TABLET, FILM COATED ORAL at 11:19

## 2018-10-21 RX ADMIN — IPRATROPIUM BROMIDE AND ALBUTEROL SULFATE SCH ML: .5; 3 SOLUTION RESPIRATORY (INHALATION) at 03:35

## 2018-10-21 RX ADMIN — BUDESONIDE SCH MG: 1 SUSPENSION RESPIRATORY (INHALATION) at 07:02

## 2018-10-21 RX ADMIN — IPRATROPIUM BROMIDE AND ALBUTEROL SULFATE SCH ML: .5; 3 SOLUTION RESPIRATORY (INHALATION) at 07:02

## 2018-10-21 RX ADMIN — INSULIN ASPART SCH UNIT: 100 INJECTION, SOLUTION INTRAVENOUS; SUBCUTANEOUS at 11:26

## 2018-10-21 NOTE — P.PN
Subjective


Progress Note Date: 10/21/18


Principal diagnosis: 





Asthma exacerbation





Progress note dated 10/21/2018





63-year-old male with a history of asthma exacerbation complicated by purulent 

tracheobronchitis.  The patient is a lifelong nonsmoker and has a history of 

hypertension and gastroesophageal reflux disease.  The patient may be 

discharged home today.  We'll awaiting the input from the hospital service.  

The patient's breathing is much improved.  He's responded very nicely to 

breathing treatments oral antibiotics and corticosteroids.  He would like to 

see me in the office after his discharge so that we could better evaluate and 

treat his chronic bronchial asthma.  We gave him a card.  His complaints 

include chest tightness wheezing coughing and shortness of breath all of which 

have significantly improved over the last couple of days.  There is no chest 

pain or pressure.  No fever chills nausea vomiting or diarrhea.





Objective





- Vital Signs


Vital signs: 


 Vital Signs











Temp  98.2 F   10/21/18 08:15


 


Pulse  75   10/21/18 11:18


 


Resp  20   10/21/18 08:15


 


BP  126/73   10/21/18 08:15


 


Pulse Ox  94 L  10/21/18 08:15








 Intake & Output











 10/20/18 10/21/18 10/21/18





 18:59 06:59 18:59


 


Intake Total  780 240


 


Balance  780 240


 


Weight  75.466 kg 


 


Intake:   


 


  Oral  780 240


 


Other:   


 


  Voiding Method  Urinal 


 


  # Voids 1 2 2














- Exam





No acute distress, oriented 3.  No stoney respiratory distress.  No use of 

accessory muscles.  No audible wheezing.





HEENT examination is grossly unremarkable.  Mucous membranes are moist.  No 

oral lesions.





Neck supple.  Full range of motion.  No adenopathy thyromegaly or neck vein 

distention.





Cardiovascular examination reveals regular rhythm rate.  S1-S2 normal.  No S3 

or S4.  No discernible murmur noted.





Lungs reveal high-pitched bilateral expiratory wheezes.  Breath sounds are much 

improved.  There is prolongation on forced maneuver.  No crackles or rhonchi.  

Breath sounds are diminished throughout.





Abdomen soft bowel sounds are heard.  No masses or tenderness.





Extremities are intact.  No cyanosis clubbing or edema.





Skin is without rash or lesion.





Neurologic examination is brief but nonfocal.





- Labs


CBC & Chem 7: 


 10/20/18 08:50





 10/20/18 08:50


Labs: 


 Abnormal Lab Results - Last 24 Hours (Table)











  10/20/18 10/20/18 10/20/18 Range/Units





  12:00 16:21 19:55 


 


POC Glucose (mg/dL)  134 H  164 H  159 H  (75-99)  mg/dL














  10/21/18 10/21/18 Range/Units





  06:25 11:23 


 


POC Glucose (mg/dL)  131 H  145 H  (75-99)  mg/dL








 Microbiology - Last 24 Hours (Table)











 10/21/18 08:30 Gram Stain - Final





 Sputum Sputum Culture - Final














Assessment and Plan


Assessment: 





Assessment





Asthma exacerbation likely complicated by purulent tracheobronchitis





Lifelong nonsmoker.





History of hypertension





History of gastroesophageal reflux disease








Plan: 





Plan dated 10/19/2018





The patient's medications are reviewed.  He'll need a short acting beta agonist

, I short acting muscarinic antagonist, a long-acting beta agonist and inhaled 

corticosteroid.  In addition, the patient will need systemic corticosteroids 

and oral antibiotics.  He probably will need another day or 2 in the hospital.  

He apparently was on Singulair in the past but stopped it because it caused 

abdominal pains.  He stopped and then resumed it after 2 weeks and it again 

caused abdominal pains and he has never gone back on it.  In addition, he is 

getting ALLERGY shots for one of the local allergist.  He get shots every 2 

weeks.  The patient will need follow-up in our office post discharge.  No 

additional recommendations are made.  Prognosis is guarded.





Plan dated 10/21/2018





The patient feels much improved.  He will like to be discharged home.  It will 

be up to his primary physician.The hospital doctor.  The patient is currently 

on breathing treatments as well as steroids and antibiotics.  He should be 

discharged home on a prednisone burst and taper beginning with 40 mg a day for 

4 days, 30 mg a day for 4 days, 20 mg a day for 4 days and finally 10 times a 

day for 4 days then stop.  In addition, he'll be discharged home on a short 

course of Levaquin 500 mg a day for 5 days.  The patient will be sent home on 

all his usual medications.  He could not tolerate Singulair as it causes 

abdominal pain on 2 separate occasions.  We'll wait and see him in the office.  

Microbiology is negative.  No new laboratory data to report.


Time with Patient: Less than 30

## 2018-10-21 NOTE — P.DS
Providers


Date of admission: 


10/19/18 01:01





Attending physician: 


Fish Quintero MD





Consults: 





 





10/19/18 01:01


Consult Physician Routine 


   Consulting Provider: Carlos Chaney Reason/Comments: Asthma exacerbation


   Do you want consulting provider notified?: Yes, Notify in am











Primary care physician: 


Lucy Virgen





[facility] Course: 


This dictation is both progress note and discharge summary


She is admitted for asthma exacerbation.  Patient is still unable to bring up 

anything still wheezing on exam.  Saturating well on room air patient wanted to 

go home will ablate him in the hallway.  Vital stable  stays stable and oxygen 

saturation stays stable if cleared by Pulmonology patient will be discharged on 

weaning dose of steroids are do not believe patient will need antibiotics 

patient has asthmatic bronchitis.





PHYSICAL EXAMINATION: 





GENERAL: The patient is alert and oriented x3, not in any acute distress. Well 

developed, well nourished. 


HEENT: Pupils are round and equally reacting to light. EOMI. No scleral 

icterus. No conjunctival pallor. Normocephalic, atraumatic. No pharyngeal 

erythema. No thyromegaly. 


CARDIOVASCULAR: S1 and S2 present. No murmurs, rubs, or gallops. 


PULMONARY: Expiratory wheezing on exam


ABDOMEN: Soft, nontender, nondistended, normoactive bowel sounds. No palpable 

organomegaly. 


MUSCULOSKELETAL: No joint swelling or deformity.


EXTREMITIES: No cyanosis, clubbing, or pedal edema. 


NEUROLOGICAL: Gross neurological examination did not reveal any focal deficits. 


SKIN: No rashes. 








-Acute exacerbation of asthma patient appears to have chronic intermittent 

asthma


-Essential hypertension


-Gastroesophageal reflux disease


Patient Condition at Discharge: Serious





Plan - Discharge Summary


Discharge Rx Participant: No


New Discharge Prescriptions: 


New


   predniSONE 10 mg PO DAILY #30 tab





No Action


   Lisinopril-Hctz 20-12.5 mg [Zestoretic 20-12.5] 1 tab PO DAILY


   Fluticasone Propionate [Flovent Diskus] 1 puff INHALATION RT-DAILY PRN


     PRN Reason: Shortness Of Breath


   Beclomethasone Dipropionate [Qvar 80 mcg] 1 puff INHALATION RT-BID


   Albuterol Sulfate [Proair Hfa] 1 - 2 puff INHALATION Q6HR PRN


     PRN Reason: Shortness Of Breath


   Pantoprazole [Protonix] 40 mg PO DAILY


Discharge Medication List





Albuterol Sulfate [Proair Hfa] 1 - 2 puff INHALATION Q6HR PRN 10/18/18 [History]


Beclomethasone Dipropionate [Qvar 80 mcg] 1 puff INHALATION RT-BID 10/18/18 [

History]


Fluticasone Propionate [Flovent Diskus] 1 puff INHALATION RT-DAILY PRN 10/18/18 

[History]


Lisinopril-Hctz 20-12.5 mg [Zestoretic 20-12.5] 1 tab PO DAILY 10/18/18 [History

]


Pantoprazole [Protonix] 40 mg PO DAILY 10/18/18 [History]


predniSONE 10 mg PO DAILY #30 tab 10/21/18 [Rx]








Follow up Appointment(s)/Referral(s): 


Lucy Virgen DO [Primary Care Provider] - 3 Days


Carlos Chaney DO [Doctor of Osteopathic Medicine] - 1 Week


Patient Instructions/Handouts:  Asthma (DC)


Discharge Disposition: HOME SELF-CARE

## 2018-11-09 ENCOUNTER — HOSPITAL ENCOUNTER (OUTPATIENT)
Dept: HOSPITAL 47 - RADUSWWP | Age: 63
Discharge: HOME | End: 2018-11-09
Attending: UROLOGY
Payer: COMMERCIAL

## 2018-11-09 DIAGNOSIS — R31.9: Primary | ICD-10-CM

## 2018-11-09 PROCEDURE — 76770 US EXAM ABDO BACK WALL COMP: CPT

## 2018-11-12 NOTE — US
EXAMINATION TYPE: US kidneys/renal and bladder

 

DATE OF EXAM: 11/9/2018

 

COMPARISON: NONE

 

CLINICAL HISTORY: R31.9 HEMATURIA. Microscopic hematuria per patient

 

EXAM MEASUREMENTS:

 

Right Kidney:  12.2 x 7.0 x 5.9 cm

Left Kidney: 12.0 x 7.0 x 6.1  cm

Post Void Residual Volume:  3.0 mL

 

 

 

Right Kidney: No hydronephrosis or masses seen  

Left Kidney: No hydronephrosis or masses seen; vessel wall calcification seen as hyperechoic parallel
 lines inferior pole  

Bladder: wnl

**Bilateral Jets seen: yes

**Normal Post Void Residual: yes

 

Cortical medullary differentiation is maintained.

 

There is no ascites.

 

IMPRESSION:

No significant abnormality is evident.

## 2025-04-04 ENCOUNTER — HOSPITAL ENCOUNTER (OUTPATIENT)
Dept: HOSPITAL 47 - ORWHC2ENDO | Age: 70
Discharge: HOME | End: 2025-04-04
Attending: INTERNAL MEDICINE
Payer: COMMERCIAL

## 2025-04-04 VITALS — SYSTOLIC BLOOD PRESSURE: 133 MMHG | RESPIRATION RATE: 18 BRPM | HEART RATE: 69 BPM | DIASTOLIC BLOOD PRESSURE: 71 MMHG

## 2025-04-04 VITALS — TEMPERATURE: 98.5 F

## 2025-04-04 DIAGNOSIS — K21.9: ICD-10-CM

## 2025-04-04 DIAGNOSIS — K44.9: ICD-10-CM

## 2025-04-04 DIAGNOSIS — K29.50: Primary | ICD-10-CM

## 2025-04-04 DIAGNOSIS — D64.9: ICD-10-CM

## 2025-04-04 PROCEDURE — 88305 TISSUE EXAM BY PATHOLOGIST: CPT

## 2025-04-04 PROCEDURE — 43239 EGD BIOPSY SINGLE/MULTIPLE: CPT

## 2025-04-04 RX ADMIN — POTASSIUM CHLORIDE SCH MLS/HR: 14.9 INJECTION, SOLUTION INTRAVENOUS at 14:19

## 2025-04-04 RX ADMIN — POTASSIUM CHLORIDE ONE MLS: 14.9 INJECTION, SOLUTION INTRAVENOUS at 14:20
